# Patient Record
Sex: MALE | Race: WHITE | NOT HISPANIC OR LATINO | Employment: FULL TIME | URBAN - METROPOLITAN AREA
[De-identification: names, ages, dates, MRNs, and addresses within clinical notes are randomized per-mention and may not be internally consistent; named-entity substitution may affect disease eponyms.]

---

## 2021-09-03 ENCOUNTER — TREATMENT (OUTPATIENT)
Dept: PHYSICAL THERAPY | Facility: CLINIC | Age: 66
End: 2021-09-03

## 2021-09-03 DIAGNOSIS — M54.2 PAIN, NECK: Primary | ICD-10-CM

## 2021-09-03 PROCEDURE — 20561 NDL INSJ W/O NJX 3+ MUSC: CPT | Performed by: PHYSICAL THERAPIST

## 2021-09-03 NOTE — PROGRESS NOTES
Physical Therapy Initial Evaluation and Plan of Care    Patient: Bright Goodwin   : 1955  Diagnosis/ICD-10 Code:  No primary diagnosis found.  Referring practitioner: No ref. provider found  Date of Initial Visit: 9/3/2021  Today's Date: 9/3/2021  Patient seen for 1 sessions             Subjective Evaluation    History of Present Illness  Mechanism of injury: Pt has been dealing with pain at the base of the neck for about 7 months. He also reports that he has some pain that radiates as a shooting pain to the back/outside of the left shoulder. He feels like there is a constant aggravating pain in the neck with intermittent pain going to the left shoulder.     Pain  Current pain rating: 3  At best pain ratin  At worst pain rating: 10  Location: Base of the neck and in to the left shoulder   Quality: radiating  Relieving factors: rest (tilting the head back)  Aggravating factors: overhead activity, lifting and outstretched reach (sleeping on the left side. )  Progression: worsening    Hand dominance: right    Treatments  No previous or current treatments  Patient Goals  Patient goals for therapy: decreased pain, increased motion, increased strength and independence with ADLs/IADLs             Objective          Palpation     Additional Palpation Details  TTP noted in the cervical paraspinals and left upper trap     Active Range of Motion   Cervical/Thoracic Spine   Cervical    Flexion: 43 degrees   Extension: 48 degrees   Left rotation: 34 degrees   Right rotation: 48 degrees     Tests   Cervical     Left   Positive Spurling's sign.           Assessment & Plan     Assessment  Impairments: abnormal muscle tone, abnormal or restricted ROM, activity intolerance, impaired physical strength, lacks appropriate home exercise program and pain with function  Assessment details: Patient is a 66 year old male who comes to physical therapy with c/o pain in the neck. Signs and symptoms are consistent with cervical  parapsinals hypertonicity and possible left cervical disc pathology resulting in pain, decreased ROM, decreased strength, and inability to perform all essential functional activities. Pt will benefit from skilled PT services to address the above issues.     Prognosis details: SHORT TERM GOALS:     2 weeks  1. Pt independent with HEP  2. Pt to demonstrate cervical AROM 50-75% of expected norms to allow for improved ability to perform ADL's  3. Pt to report not having any headaches related to neck pain for the past 3 days    LONG TERM GOALS:   6 weeks  1. Pt to demonstrate cervical AROM % of expected norms to allow for improved safety when driving  2. Pt to demonstrate ability to lift 10# OH with left arm(s) without increase in pain in the neck   3. Pt to report being able to work full shift or work in the home without increase in pain in the neck    Functional Limitations: carrying objects, lifting, pulling, pushing, uncomfortable because of pain and unable to perform repetitive tasks  Plan  Therapy options: will be seen for skilled physical therapy services  Planned modality interventions: cryotherapy, high voltage pulsed current (pain management), iontophoresis, microcurrent electrical stimulation, TENS, thermotherapy (hydrocollator packs), ultrasound and traction  Planned therapy interventions: ADL retraining, body mechanics training, flexibility, functional ROM exercises, home exercise program, IADL retraining, joint mobilization, manual therapy, motor coordination training, neuromuscular re-education, postural training, soft tissue mobilization, spinal/joint mobilization, strengthening and stretching  Frequency: 2x week  Duration in weeks: 6  Treatment plan discussed with: patient        Manual Therapy:         mins  67208;  Therapeutic Exercise:         mins  03566;     Neuromuscular Dang:        mins  96556;    Therapeutic Activity:          mins  22542;     Gait Training:           mins  01300;      Ultrasound:          mins  49374;    Electrical Stimulation:         mins  40478 ( );  Iontophoresis          mins 40748   Traction          mins  51046  Fluidotherapy          mins  20228  Dry Needling     35     mins self-pay  Paraffin          mins  93196    Timed Treatment:   35   mins   Total Treatment:     35   mins    PT SIGNATURE: Roverto Scherer, PT, DPT, OCS, Cert. DN   DATE TREATMENT INITIATED: 9/3/2021    Initial Certification  Certification Period: 12/2/2021  I certify that the therapy services are furnished while this patient is under my care.  The services outlined above are required by this patient, and will be reviewed every 90 days.     PHYSICIAN:       DATE:     Please sign and return via fax to [phone].. Thank you, [facility].

## 2021-09-14 ENCOUNTER — TREATMENT (OUTPATIENT)
Dept: PHYSICAL THERAPY | Facility: CLINIC | Age: 66
End: 2021-09-14

## 2021-09-14 DIAGNOSIS — M54.2 PAIN, NECK: Primary | ICD-10-CM

## 2021-09-14 PROCEDURE — 20561 NDL INSJ W/O NJX 3+ MUSC: CPT | Performed by: PHYSICAL THERAPIST

## 2021-09-16 NOTE — PROGRESS NOTES
Physical Therapy Daily Progress Note    Subjective   Bright Goodwin reports that he had relief for about 10 days after the first dry needling session. He did a lot of heavy lifting earlier this week which caused some pain to come back in the neck.     Today's Pain rating: 3/10    Objective   See Exercise, Manual, and Modality Logs for complete treatment.       Assessment/Plan     Pt tolerated treatment well and reported an immediate decrease in pain intensity. Will assess at next visit and progress as indicated.     Progress per Plan of Care and Progress strengthening /stabilization /functional activity           Manual Therapy:         mins  44267;  Therapeutic Exercise:         mins  39870;     Neuromuscular Dang:        mins  41671;    Therapeutic Activity:          mins  31940;     Gait Training:           mins  76377;     Ultrasound:          mins  70351;    Electrical Stimulation:         mins  39952 ( );  E-Stim Attended:         mins  37382  Iontophoresis          mins 78869   Traction          mins  42300  Fluidotherapy          mins  72714  Dry Needling     30     mins self-pay   Paraffin          mins  67057    Timed Treatment:   30   mins   Total Treatment:     30   mins    Roverto Scherer, PT, DPT, OCS, Cert. DN  Physical Therapist

## 2021-10-01 ENCOUNTER — TREATMENT (OUTPATIENT)
Dept: PHYSICAL THERAPY | Facility: CLINIC | Age: 66
End: 2021-10-01

## 2021-10-01 DIAGNOSIS — M54.2 PAIN, NECK: Primary | ICD-10-CM

## 2021-10-01 PROCEDURE — 20561 NDL INSJ W/O NJX 3+ MUSC: CPT | Performed by: PHYSICAL THERAPIST

## 2021-10-01 NOTE — PROGRESS NOTES
Physical Therapy Daily Progress Note    Subjective   Bright Goodwin reports that he had relief for about 11-12 days after his last needling session. Despite his neck/shoulder pain coming back, he states that the pain intensity is decreased.     Today's Pain ratin/10    Objective   See Exercise, Manual, and Modality Logs for complete treatment.       Assessment/Plan     Pt is responding well to treatment and he demonstrates less hypertonicity in the cervical paraspinals and upper traps. Will continue with dry needling treatment in 2 weeks if symptoms return.     Progress per Plan of Care and Progress strengthening /stabilization /functional activity           Manual Therapy:         mins  77235;  Therapeutic Exercise:         mins  26660;     Neuromuscular Dang:        mins  77914;    Therapeutic Activity:          mins  53085;     Gait Training:           mins  64047;     Ultrasound:          mins  97479;    Electrical Stimulation:         mins  97702 ( );  E-Stim Attended:         mins  06394  Iontophoresis          mins 06751   Traction          mins  22742  Fluidotherapy          mins  11986  Dry Needling     30     mins self-pay  Paraffin          mins  45883    Timed Treatment:   30   mins   Total Treatment:     30   mins    Roverto Scherer, PT, DPT, OCS, Cert. DN  Physical Therapist

## 2021-10-15 ENCOUNTER — TREATMENT (OUTPATIENT)
Dept: PHYSICAL THERAPY | Facility: CLINIC | Age: 66
End: 2021-10-15

## 2021-10-15 DIAGNOSIS — M54.2 PAIN, NECK: Primary | ICD-10-CM

## 2021-10-15 PROCEDURE — 20561 NDL INSJ W/O NJX 3+ MUSC: CPT | Performed by: PHYSICAL THERAPIST

## 2021-10-19 NOTE — PROGRESS NOTES
Physical Therapy Daily Progress Note    Subjective   Bright Goodwin reports that he continues to feel improvement with dry needling. The symptoms come back, but they do not seem to be as severe and he feels that his neck is moving much better     Today's Pain ratin/10     Objective   See Exercise, Manual, and Modality Logs for complete treatment.       Assessment/Plan     Pt tolerated longer e-stim session well and did not report any exacerbation or excessive pain.     Progress per Plan of Care and Progress strengthening /stabilization /functional activity           Manual Therapy:         mins  12967;  Therapeutic Exercise:         mins  70743;     Neuromuscular Dang:        mins  67998;    Therapeutic Activity:          mins  82047;     Gait Training:           mins  87728;     Ultrasound:          mins  28968;    Electrical Stimulation:         mins  51745 ( );  E-Stim Attended:         mins  18989  Iontophoresis          mins 11562   Traction          mins  84145  Fluidotherapy          mins  43552  Dry Needling     35     mins self-pay - No Charge  Paraffin          mins  49471    Timed Treatment:   35   mins   Total Treatment:     35   mins    Roverto Scherer, PT, DPT, OCS, Cert. DN  Physical Therapist

## 2022-10-10 ENCOUNTER — TRANSCRIBE ORDERS (OUTPATIENT)
Dept: ADMINISTRATIVE | Facility: HOSPITAL | Age: 67
End: 2022-10-10

## 2022-10-10 ENCOUNTER — HOSPITAL ENCOUNTER (OUTPATIENT)
Dept: GENERAL RADIOLOGY | Facility: HOSPITAL | Age: 67
Discharge: HOME | End: 2022-10-10
Admitting: FAMILY MEDICINE

## 2022-10-10 DIAGNOSIS — M54.2 CERVICALGIA: Primary | ICD-10-CM

## 2022-10-10 DIAGNOSIS — M54.2 CERVICALGIA: ICD-10-CM

## 2022-10-10 PROCEDURE — 72050 X-RAY EXAM NECK SPINE 4/5VWS: CPT

## 2022-12-29 ENCOUNTER — TREATMENT (OUTPATIENT)
Dept: PHYSICAL THERAPY | Facility: CLINIC | Age: 67
End: 2022-12-29

## 2022-12-29 DIAGNOSIS — M54.2 PAIN, NECK: Primary | ICD-10-CM

## 2022-12-29 PROCEDURE — 20561 NDL INSJ W/O NJX 3+ MUSC: CPT | Performed by: PHYSICAL THERAPIST

## 2022-12-30 NOTE — PROGRESS NOTES
Physical Therapy Daily Treatment Note    [facility]   [address], [address] [place] [address]      Patient: Bright Goodwin   : 1955  Referring practitioner: No ref. provider found  Date of Initial Visit: Type: THERAPY  Noted: 9/3/2021  Today's Date: 2022  Patient seen for 5 sessions    Certification Period 2022 thru 3/28/2023       Visit Diagnoses:    ICD-10-CM ICD-9-CM   1. Pain, neck  M54.2 723.1       Subjective     Pt states that he had improvement in his neck pain and UE symptoms with dry needling previously, and he is coming back to resume treatment before spenidng some extended time in [place] for the winter.     Objective   See Exercise, Manual, and Modality Logs for complete treatment.       Assessment/Plan     Pt responded wel to treatment and did not have any adverse reaction. Will cont to progress as indicated.       Timed:         Manual Therapy:         mins  47524;     Therapeutic Exercise:         mins  03905;     Neuromuscular Dang:        mins  63610;    Therapeutic Activity:          mins  03078;     Gait Training:           mins  01138;     Ultrasound:          mins  71962;    Ionto                                   mins   99836  Self Care                            mins   42726  Canalith Repos         mins 41167  Electrical Stimulation:         mins  75901    Un-Timed:  Electrical Stimulation:         mins  39235 ( );  Dry Needling     30     mins self-pay  Traction          mins 34692      Timed Treatment:      mins   Total Treatment:        mins    Roverto Scherer, PT, DPT, OCS, Cert. DN  [place] License: [id number]

## 2023-01-05 ENCOUNTER — TRANSCRIBE ORDERS (OUTPATIENT)
Dept: ADMINISTRATIVE | Facility: HOSPITAL | Age: 68
End: 2023-01-05
Payer: MEDICARE

## 2023-01-05 ENCOUNTER — HOSPITAL ENCOUNTER (OUTPATIENT)
Dept: GENERAL RADIOLOGY | Facility: HOSPITAL | Age: 68
Discharge: HOME | End: 2023-01-05
Admitting: NURSE PRACTITIONER
Payer: MEDICARE

## 2023-01-05 DIAGNOSIS — J20.8 ACUTE BRONCHITIS DUE TO OTHER SPECIFIED ORGANISMS: Primary | ICD-10-CM

## 2023-01-05 PROCEDURE — 71046 X-RAY EXAM CHEST 2 VIEWS: CPT

## 2023-01-11 ENCOUNTER — TELEPHONE (OUTPATIENT)
Dept: PHYSICAL THERAPY | Facility: CLINIC | Age: 68
End: 2023-01-11

## 2023-01-25 ENCOUNTER — TREATMENT (OUTPATIENT)
Dept: PHYSICAL THERAPY | Facility: CLINIC | Age: 68
End: 2023-01-25

## 2023-01-25 DIAGNOSIS — M54.2 PAIN, NECK: Primary | ICD-10-CM

## 2023-01-25 PROCEDURE — 20561 NDL INSJ W/O NJX 3+ MUSC: CPT | Performed by: PHYSICAL THERAPIST

## 2023-01-31 NOTE — PROGRESS NOTES
Physical Therapy Daily Treatment Note    [facility]   [address], [address] [place] [address]      Patient: Bright Goodwin   : 1955  Referring practitioner: No ref. provider found  Date of Initial Visit: Type: THERAPY  Noted: 9/3/2021  Today's Date: 2023  Patient seen for 6 sessions    Certification Period 2023 thru 2023       Visit Diagnoses:    ICD-10-CM ICD-9-CM   1. Pain, neck  M54.2 723.1       Subjective     Pt states that he feels like he had some improvement in his neck pain and ability to perform activity throughout the day without increasing his pain.     Objective   See Exercise, Manual, and Modality Logs for complete treatment.       Assessment/Plan     Pt continues to respond well to treatment and he demonstrates an improvement in his cervical ROM in all planes. Will cont to progress as indicated.       Timed:         Manual Therapy:         mins  61868;     Therapeutic Exercise:         mins  18818;     Neuromuscular Dang:        mins  12529;    Therapeutic Activity:          mins  59042;     Gait Training:           mins  42010;     Ultrasound:          mins  95058;    Ionto                                   mins   91395  Self Care                            mins   25714  Canalith Repos         mins 52799  Electrical Stimulation:         mins  60465    Un-Timed:  Electrical Stimulation:         mins  52332 ( );  Dry Needling     30     mins self-pay  Traction          mins 26160      Timed Treatment:      mins   Total Treatment:     30   mins    Roverto Scherer, PT, DPT, OCS, Cert. DN  [place] License: [id number]

## 2023-02-01 ENCOUNTER — TELEPHONE (OUTPATIENT)
Dept: PHYSICAL THERAPY | Facility: CLINIC | Age: 68
End: 2023-02-01

## 2023-02-01 NOTE — TELEPHONE ENCOUNTER
PLEASE GIVE PATIENT A CALL IF AN APPOINTMENT COMES AVAILABLE FOR DRY NEEDLING WITH [facility] EITHER 2/8/23 OR EARLY 2/9/23. PATIENT IS GOING OUT OF TOWN AND HAD TO CANCEL APPOINTMENT ON 2/9/23.

## 2023-09-25 ENCOUNTER — HOSPITAL ENCOUNTER (EMERGENCY)
Facility: HOSPITAL | Age: 68
Discharge: HOME | End: 2023-09-25
Attending: EMERGENCY MEDICINE | Admitting: EMERGENCY MEDICINE
Payer: OTHER MISCELLANEOUS

## 2023-09-25 ENCOUNTER — APPOINTMENT (OUTPATIENT)
Dept: MRI IMAGING | Facility: HOSPITAL | Age: 68
End: 2023-09-25
Payer: OTHER MISCELLANEOUS

## 2023-09-25 VITALS
BODY MASS INDEX: 26.14 KG/M2 | WEIGHT: 193 LBS | OXYGEN SATURATION: 95 % | HEIGHT: 72 IN | SYSTOLIC BLOOD PRESSURE: 138 MMHG | DIASTOLIC BLOOD PRESSURE: 83 MMHG | HEART RATE: 66 BPM | RESPIRATION RATE: 16 BRPM | TEMPERATURE: 98 F

## 2023-09-25 DIAGNOSIS — E87.6 HYPOKALEMIA: ICD-10-CM

## 2023-09-25 DIAGNOSIS — R42 DIZZINESS: Primary | ICD-10-CM

## 2023-09-25 LAB
ALBUMIN SERPL-MCNC: 4.2 G/DL (ref 3.5–5.2)
ALBUMIN/GLOB SERPL: 1.3 G/DL
ALP SERPL-CCNC: 53 U/L (ref 39–117)
ALT SERPL W P-5'-P-CCNC: 63 U/L (ref 1–41)
ANION GAP SERPL CALCULATED.3IONS-SCNC: 14 MMOL/L (ref 5–15)
AST SERPL-CCNC: 45 U/L (ref 1–40)
BASOPHILS # BLD AUTO: 0.05 10*3/MM3 (ref 0–0.2)
BASOPHILS NFR BLD AUTO: 0.4 % (ref 0–1.5)
BILIRUB SERPL-MCNC: 0.9 MG/DL (ref 0–1.2)
BUN SERPL-MCNC: 21 MG/DL (ref 8–23)
BUN/CREAT SERPL: 18.3 (ref 7–25)
CALCIUM SPEC-SCNC: 9.5 MG/DL (ref 8.6–10.5)
CHLORIDE SERPL-SCNC: 100 MMOL/L (ref 98–107)
CK SERPL-CCNC: 110 U/L (ref 20–200)
CO2 SERPL-SCNC: 26 MMOL/L (ref 22–29)
CREAT SERPL-MCNC: 1.15 MG/DL (ref 0.76–1.27)
DEPRECATED RDW RBC AUTO: 40.1 FL (ref 37–54)
EGFRCR SERPLBLD CKD-EPI 2021: 69.8 ML/MIN/1.73
EOSINOPHIL # BLD AUTO: 0.1 10*3/MM3 (ref 0–0.4)
EOSINOPHIL NFR BLD AUTO: 0.9 % (ref 0.3–6.2)
ERYTHROCYTE [DISTWIDTH] IN BLOOD BY AUTOMATED COUNT: 12.8 % (ref 12.3–15.4)
GLOBULIN UR ELPH-MCNC: 3.2 GM/DL
GLUCOSE SERPL-MCNC: 101 MG/DL (ref 65–99)
HCT VFR BLD AUTO: 43.3 % (ref 37.5–51)
HGB BLD-MCNC: 14.9 G/DL (ref 13–17.7)
IMM GRANULOCYTES # BLD AUTO: 0.05 10*3/MM3 (ref 0–0.05)
IMM GRANULOCYTES NFR BLD AUTO: 0.4 % (ref 0–0.5)
LYMPHOCYTES # BLD AUTO: 2.01 10*3/MM3 (ref 0.7–3.1)
LYMPHOCYTES NFR BLD AUTO: 17.4 % (ref 19.6–45.3)
MAGNESIUM SERPL-MCNC: 2.2 MG/DL (ref 1.6–2.4)
MCH RBC QN AUTO: 30 PG (ref 26.6–33)
MCHC RBC AUTO-ENTMCNC: 34.4 G/DL (ref 31.5–35.7)
MCV RBC AUTO: 87.1 FL (ref 79–97)
MONOCYTES # BLD AUTO: 0.87 10*3/MM3 (ref 0.1–0.9)
MONOCYTES NFR BLD AUTO: 7.5 % (ref 5–12)
NEUTROPHILS NFR BLD AUTO: 73.4 % (ref 42.7–76)
NEUTROPHILS NFR BLD AUTO: 8.47 10*3/MM3 (ref 1.7–7)
NRBC BLD AUTO-RTO: 0 /100 WBC (ref 0–0.2)
PLATELET # BLD AUTO: 246 10*3/MM3 (ref 140–450)
PMV BLD AUTO: 10.2 FL (ref 6–12)
POTASSIUM SERPL-SCNC: 3.2 MMOL/L (ref 3.5–5.2)
PROT SERPL-MCNC: 7.4 G/DL (ref 6–8.5)
RBC # BLD AUTO: 4.97 10*6/MM3 (ref 4.14–5.8)
SODIUM SERPL-SCNC: 140 MMOL/L (ref 136–145)
WBC NRBC COR # BLD: 11.55 10*3/MM3 (ref 3.4–10.8)

## 2023-09-25 PROCEDURE — 83735 ASSAY OF MAGNESIUM: CPT | Performed by: EMERGENCY MEDICINE

## 2023-09-25 PROCEDURE — 99284 EMERGENCY DEPT VISIT MOD MDM: CPT

## 2023-09-25 PROCEDURE — 70551 MRI BRAIN STEM W/O DYE: CPT

## 2023-09-25 PROCEDURE — 82550 ASSAY OF CK (CPK): CPT | Performed by: EMERGENCY MEDICINE

## 2023-09-25 PROCEDURE — 85025 COMPLETE CBC W/AUTO DIFF WBC: CPT | Performed by: EMERGENCY MEDICINE

## 2023-09-25 PROCEDURE — 36415 COLL VENOUS BLD VENIPUNCTURE: CPT

## 2023-09-25 PROCEDURE — 93005 ELECTROCARDIOGRAM TRACING: CPT | Performed by: EMERGENCY MEDICINE

## 2023-09-25 PROCEDURE — 80053 COMPREHEN METABOLIC PANEL: CPT | Performed by: EMERGENCY MEDICINE

## 2023-09-25 RX ORDER — SODIUM CHLORIDE 0.9 % (FLUSH) 0.9 %
10 SYRINGE (ML) INJECTION AS NEEDED
Status: DISCONTINUED | OUTPATIENT
Start: 2023-09-25 | End: 2023-09-25 | Stop reason: HOSPADM

## 2023-09-25 RX ORDER — KETOROLAC TROMETHAMINE 15 MG/ML
15 INJECTION, SOLUTION INTRAMUSCULAR; INTRAVENOUS ONCE
Status: DISCONTINUED | OUTPATIENT
Start: 2023-09-25 | End: 2023-09-25 | Stop reason: HOSPADM

## 2023-09-25 RX ORDER — POTASSIUM CHLORIDE 750 MG/1
10 TABLET, FILM COATED, EXTENDED RELEASE ORAL 2 TIMES DAILY
Qty: 6 TABLET | Refills: 0 | Status: SHIPPED | OUTPATIENT
Start: 2023-09-25 | End: 2023-09-28

## 2023-09-25 RX ORDER — POTASSIUM CHLORIDE 1.5 G/1.58G
20 POWDER, FOR SOLUTION ORAL ONCE
Status: COMPLETED | OUTPATIENT
Start: 2023-09-25 | End: 2023-09-25

## 2023-09-25 RX ORDER — MECLIZINE HYDROCHLORIDE 25 MG/1
25 TABLET ORAL ONCE
Status: COMPLETED | OUTPATIENT
Start: 2023-09-25 | End: 2023-09-25

## 2023-09-25 RX ADMIN — MECLIZINE HYDROCHLORIDE 25 MG: 25 TABLET ORAL at 21:42

## 2023-09-25 RX ADMIN — POTASSIUM CHLORIDE 20 MEQ: 1.5 POWDER, FOR SOLUTION ORAL at 21:42

## 2023-09-26 NOTE — ED PROVIDER NOTES
Subjective   History of Present Illness  68-year-old male presents for evaluation of dizziness.  The patient tells me that he has been symptomatic intermittently throughout the day today but notes that his symptoms are much worse this evening.  He tells me that he was planting brannon outside all day in the heat.  He had very little to eat throughout the day and wonders if this could be a contributing factor.  This evening he began experiencing both a headache as well as dizziness and as result came here to the emergency department to be evaluated.  He denies any thunderclap onset to his headache.  This is not the worst headache of his life.  No neck pain or stiffness.  He denies any accompanying vomiting.  No tinnitus.  No fevers.    Review of Systems   Neurological:  Positive for dizziness and headaches.   All other systems reviewed and are negative.    Past Medical History:   Diagnosis Date    Hypertension        No Known Allergies    Past Surgical History:   Procedure Laterality Date    APPENDECTOMY      FRACTURE SURGERY      right shoulder    REPLACEMENT TOTAL KNEE BILATERAL Bilateral     TONSILLECTOMY         No family history on file.    Social History     Socioeconomic History    Marital status:    Tobacco Use    Smoking status: Never   Substance and Sexual Activity    Alcohol use: Defer    Drug use: Defer    Sexual activity: Defer           Objective   Physical Exam  Vitals and nursing note reviewed.   Constitutional:       General: He is not in acute distress.     Appearance: He is well-developed. He is not diaphoretic.      Comments: Well-appearing male in no acute distress   HENT:      Head: Normocephalic and atraumatic.   Eyes:      Pupils: Pupils are equal, round, and reactive to light.      Comments: Visual acuity is at baseline subjectively, no nystagmus present   Neck:      Vascular: No JVD.   Cardiovascular:      Rate and Rhythm: Normal rate and regular rhythm.      Heart sounds: Normal  heart sounds. No murmur heard.    No friction rub. No gallop.   Pulmonary:      Effort: Pulmonary effort is normal. No respiratory distress.      Breath sounds: Normal breath sounds. No wheezing or rales.   Abdominal:      General: Bowel sounds are normal. There is no distension.      Palpations: Abdomen is soft. There is no mass.      Tenderness: There is no abdominal tenderness. There is no guarding.   Musculoskeletal:         General: Normal range of motion.      Cervical back: Normal range of motion.   Skin:     General: Skin is warm and dry.      Coloration: Skin is not pale.      Findings: No erythema or rash.   Neurological:      Mental Status: He is alert and oriented to person, place, and time.      Comments: Awake, alert, and oriented x3, clear and fluent speech, no ataxia or dysmetria, normal gait, neurovascularly intact distally in all fours with bounding distal pulses and normal sensation, 5 out of 5 strength in all fours, no cranial nerve deficits noted with cranial nerves II through XII grossly intact   Psychiatric:         Thought Content: Thought content normal.         Judgment: Judgment normal.       Procedures           ED Course  ED Course as of 09/26/23 0045   Mon Sep 25, 2023   1902 68-year-old male presents for evaluation of dizziness.  The patient tells me that he has been symptomatic intermittently throughout the day today but notes that his symptoms are much worse this evening.  He had been planting flowers outside all day in the heat.  He tells me that he had had very little to eat.  This evening, he began experiencing both a headache and dizziness and as a result came to the emergency department to be evaluated.  On arrival, the patient is nontoxic-appearing. [DD]   1903 Vital signs are reassuring.  Differential diagnosis is quite broad.  We will obtain labs, EKG, and imaging, and we will reassess following initial interventions. [DD]   2132 Labs remarkable only for mild hypokalemia but  are otherwise unrevealing.  Oral potassium replacement initiated. [DD]   2134 I personally and independently reviewed the patient's MRI images and findings, and I am in agreement with the radiologist regarding MRI interpretation. [DD]   2134 Upon reevaluation, the patient looks and feels well.  I feel that he can be managed on an outpatient basis at this point.  Prescription for oral potassium replacement over the next 3 days.  He will follow-up with his primary care physician within the next week.  Agreeable with plan and given appropriate strict return precautions. [DD]      ED Course User Index  [DD] Isaias Donaldson MD                                      Recent Results (from the past 24 hour(s))   ECG 12 Lead Other; dizzy    Collection Time: 09/25/23  8:49 PM   Result Value Ref Range    QT Interval 428 ms    QTC Interval 420 ms   Comprehensive Metabolic Panel    Collection Time: 09/25/23  9:06 PM    Specimen: Blood   Result Value Ref Range    Glucose 101 (H) 65 - 99 mg/dL    BUN 21 8 - 23 mg/dL    Creatinine 1.15 0.76 - 1.27 mg/dL    Sodium 140 136 - 145 mmol/L    Potassium 3.2 (L) 3.5 - 5.2 mmol/L    Chloride 100 98 - 107 mmol/L    CO2 26.0 22.0 - 29.0 mmol/L    Calcium 9.5 8.6 - 10.5 mg/dL    Total Protein 7.4 6.0 - 8.5 g/dL    Albumin 4.2 3.5 - 5.2 g/dL    ALT (SGPT) 63 (H) 1 - 41 U/L    AST (SGOT) 45 (H) 1 - 40 U/L    Alkaline Phosphatase 53 39 - 117 U/L    Total Bilirubin 0.9 0.0 - 1.2 mg/dL    Globulin 3.2 gm/dL    A/G Ratio 1.3 g/dL    BUN/Creatinine Ratio 18.3 7.0 - 25.0    Anion Gap 14.0 5.0 - 15.0 mmol/L    eGFR 69.8 >60.0 mL/min/1.73   Magnesium    Collection Time: 09/25/23  9:06 PM    Specimen: Blood   Result Value Ref Range    Magnesium 2.2 1.6 - 2.4 mg/dL   CK    Collection Time: 09/25/23  9:06 PM    Specimen: Blood   Result Value Ref Range    Creatine Kinase 110 20 - 200 U/L   CBC Auto Differential    Collection Time: 09/25/23  9:06 PM    Specimen: Blood   Result Value Ref Range    WBC 11.55  (H) 3.40 - 10.80 10*3/mm3    RBC 4.97 4.14 - 5.80 10*6/mm3    Hemoglobin 14.9 13.0 - 17.7 g/dL    Hematocrit 43.3 37.5 - 51.0 %    MCV 87.1 79.0 - 97.0 fL    MCH 30.0 26.6 - 33.0 pg    MCHC 34.4 31.5 - 35.7 g/dL    RDW 12.8 12.3 - 15.4 %    RDW-SD 40.1 37.0 - 54.0 fl    MPV 10.2 6.0 - 12.0 fL    Platelets 246 140 - 450 10*3/mm3    Neutrophil % 73.4 42.7 - 76.0 %    Lymphocyte % 17.4 (L) 19.6 - 45.3 %    Monocyte % 7.5 5.0 - 12.0 %    Eosinophil % 0.9 0.3 - 6.2 %    Basophil % 0.4 0.0 - 1.5 %    Immature Grans % 0.4 0.0 - 0.5 %    Neutrophils, Absolute 8.47 (H) 1.70 - 7.00 10*3/mm3    Lymphocytes, Absolute 2.01 0.70 - 3.10 10*3/mm3    Monocytes, Absolute 0.87 0.10 - 0.90 10*3/mm3    Eosinophils, Absolute 0.10 0.00 - 0.40 10*3/mm3    Basophils, Absolute 0.05 0.00 - 0.20 10*3/mm3    Immature Grans, Absolute 0.05 0.00 - 0.05 10*3/mm3    nRBC 0.0 0.0 - 0.2 /100 WBC     Note: In addition to lab results from this visit, the labs listed above may include labs taken at another facility or during a different encounter within the last 24 hours. Please correlate lab times with ED admission and discharge times for further clarification of the services performed during this visit.    MRI Brain Without Contrast   Final Result   Impression:      1. Mild generalized parenchymal volume loss and changes of chronic small vessel ischemic disease.   2. No acute intracranial abnormality.            Electronically Signed: Alfredo Hogue MD     9/25/2023 9:09 PM EDT     Workstation ID: [id number]        Vitals:    09/25/23 2052 09/25/23 2100 09/25/23 2130 09/25/23 2146   BP: 130/79 132/85 138/83    BP Location:       Patient Position:       Pulse: 60 60 58 66   Resp:       Temp:       TempSrc:       SpO2: 94% 92% 90% 95%   Weight:       Height:         Medications   meclizine (ANTIVERT) tablet 25 mg (25 mg Oral Given 9/25/23 2142)   potassium chloride (KLOR-CON) packet 20 mEq (20 mEq Oral Given 9/25/23 2142)     ECG/EMG Results (last 24  hours)       Procedure Component Value Units Date/Time    ECG 12 Lead Other; dizzy [[id number]] Collected: 09/25/23 2049     Updated: 09/25/23 2049     QT Interval 428 ms      QTC Interval 420 ms     Narrative:      Test Reason : Other~  Blood Pressure :   */*   mmHG  Vent. Rate :  58 BPM     Atrial Rate :  58 BPM     P-R Int : 128 ms          QRS Dur :  86 ms      QT Int : 428 ms       P-R-T Axes :  17   4  29 degrees     QTc Int : 420 ms    Sinus bradycardia  Otherwise normal ECG  When compared with ECG of 29-SEP-2011 10:09,  T wave amplitude has decreased in Lateral leads    Referred By: EDMD           Confirmed By:           ECG 12 Lead Other; dizzy   Preliminary Result   Test Reason : Other~   Blood Pressure :   */*   mmHG   Vent. Rate :  58 BPM     Atrial Rate :  58 BPM      P-R Int : 128 ms          QRS Dur :  86 ms       QT Int : 428 ms       P-R-T Axes :  17   4  29 degrees      QTc Int : 420 ms      Sinus bradycardia   Otherwise normal ECG   When compared with ECG of 29-SEP-2011 10:09,   T wave amplitude has decreased in Lateral leads      Referred By: EDMD           Confirmed By:                  Medical Decision Making  Problems Addressed:  Dizziness: complicated acute illness or injury  Hypokalemia: complicated acute illness or injury    Amount and/or Complexity of Data Reviewed  Labs: ordered.  Radiology: ordered.  ECG/medicine tests: ordered.    Risk  Prescription drug management.        Final diagnoses:   Dizziness   Hypokalemia       ED Disposition  ED Disposition       ED Disposition   Discharge    Condition   Stable    Comment   --               Ander Dozier MD  [address] [address]  [phone]    In 1 week           Medication List        New Prescriptions      potassium chloride 10 MEQ CR tablet  Take 1 tablet by mouth 2 (Two) Times a Day for 3 days.               Where to Get Your Medications        These medications were sent to [facility] #[id number] -  [place], [place] - [address] - [phone] PH - [phone] FX  [address], [facility] [address]      Phone: [phone]   potassium chloride 10 MEQ CR tablet            Isaias Donaldson MD  09/26/23 0046

## 2023-10-01 LAB
QT INTERVAL: 428 MS
QTC INTERVAL: 420 MS

## 2025-06-17 ENCOUNTER — TRANSCRIBE ORDERS (OUTPATIENT)
Dept: ADMINISTRATIVE | Facility: HOSPITAL | Age: 70
End: 2025-06-17
Payer: MEDICARE

## 2025-06-17 DIAGNOSIS — R79.89 ABNORMAL LFTS: Primary | ICD-10-CM
